# Patient Record
Sex: MALE | Race: WHITE
[De-identification: names, ages, dates, MRNs, and addresses within clinical notes are randomized per-mention and may not be internally consistent; named-entity substitution may affect disease eponyms.]

---

## 2017-09-10 ENCOUNTER — HOSPITAL ENCOUNTER (EMERGENCY)
Dept: HOSPITAL 45 - C.EDB | Age: 22
Discharge: HOME | End: 2017-09-10
Payer: COMMERCIAL

## 2017-09-10 VITALS — OXYGEN SATURATION: 92 %

## 2017-09-10 VITALS
HEIGHT: 67.99 IN | HEIGHT: 67.99 IN | BODY MASS INDEX: 23.72 KG/M2 | WEIGHT: 156.53 LBS | WEIGHT: 156.53 LBS | BODY MASS INDEX: 23.72 KG/M2

## 2017-09-10 VITALS — OXYGEN SATURATION: 99 % | SYSTOLIC BLOOD PRESSURE: 97 MMHG | DIASTOLIC BLOOD PRESSURE: 49 MMHG | HEART RATE: 90 BPM

## 2017-09-10 VITALS — TEMPERATURE: 98.24 F
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
## 2017-09-10 DIAGNOSIS — F10.129: ICD-10-CM

## 2017-09-10 DIAGNOSIS — F17.200: Primary | ICD-10-CM

## 2017-09-10 LAB
ANION GAP SERPL CALC-SCNC: 8 MMOL/L (ref 3–11)
BUN SERPL-MCNC: 22 MG/DL (ref 7–18)
BUN/CREAT SERPL: 19.6 (ref 10–20)
CALCIUM SERPL-MCNC: 8.6 MG/DL (ref 8.5–10.1)
CHLORIDE SERPL-SCNC: 109 MMOL/L (ref 98–107)
CO2 SERPL-SCNC: 26 MMOL/L (ref 21–32)
CREAT CL PREDICTED SERPL C-G-VRATE: 103.6 ML/MIN
CREAT SERPL-MCNC: 1.1 MG/DL (ref 0.6–1.4)
GLUCOSE SERPL-MCNC: 90 MG/DL (ref 70–99)
POTASSIUM SERPL-SCNC: 3.6 MMOL/L (ref 3.5–5.1)
SODIUM SERPL-SCNC: 143 MMOL/L (ref 136–145)

## 2017-09-10 NOTE — EMERGENCY ROOM VISIT NOTE
ED Visit Note


First contact with patient:  08:15


Patient was signed out to me by Ron ALVARENGA.  Please see her dictation for full 

history and physical.  Patient remained stable while in the department this 

morning.  He did awake this morning without complaints.  He was reexamined.  

Patient had no additional complaints.  He was discharged to the care of a 

friend.  Patient will follow-up with [facility] for [facility] care.  

Alcohol intoxication instructions were given.  Avoid alcohol today.  Tylenol as 

needed for any minor headache or discomfort.


Current/Historical Medications


Unable to Obtain Active Prescriptions or Reported Meds





Vital Signs











  Date Time  Temp Pulse Resp B/P (MAP) Pulse Ox O2 Delivery O2 Flow Rate FiO2


 


9/10/17 09:41  90 15 97/49 99   


 


9/10/17 08:30  74 15 109/52 95   


 


9/10/17 08:00  84 14 88/46 98   


 


9/10/17 07:30  89 12 111/59 97   


 


9/10/17 07:00  76 12 117/66 98   


 


9/10/17 06:30    128/84 97   


 


9/10/17 06:15  70 16     


 


9/10/17 06:00    113/56 98   


 


9/10/17 05:45  82 15     


 


9/10/17 05:40  80 15     


 


9/10/17 05:30    114/55    


 


9/10/17 05:10  80 16     


 


9/10/17 05:08  82      


 


9/10/17 05:00    118/55    


 


9/10/17 04:40  79 14  94   


 


9/10/17 04:30    98/40    


 


9/10/17 04:10  83 14  95   


 


9/10/17 04:00    93/43    


 


9/10/17 03:40  80 15  95   


 


9/10/17 03:35  81 16  96   


 


9/10/17 03:05  98 20  96   


 


9/10/17 03:00    98/56    


 


9/10/17 02:35  92   98   


 


9/10/17 02:30    104/59    


 


9/10/17 02:28  90 16  98 Nasal Cannula 2.0 


 


9/10/17 02:00    99/57    


 


9/10/17 01:58  98 13  97 Nasal Cannula 2.0 


 


9/10/17 01:30    92/59    


 


9/10/17 01:28  86 0  90   


 


9/10/17 01:28     92 Nasal Cannula 2.0 


 


9/10/17 01:15     96 Room Air  


 


9/10/17 01:15     96 Room Air  


 


9/10/17 01:12  79      


 


9/10/17 01:09 36.8 95 16 121/68 98 Room Air  


 


9/10/17 01:06    121/68    











Laboratory Results


9/10/17 01:06

















Test


  9/10/17


01:06


 


Anion Gap


  8.0 mmol/L


(3-11)


 


Est Creatinine Clear Calc


Drug Dose 103.6 ml/min 


 


 


Estimated GFR (


American) 111.4 


 


 


Estimated GFR (Non-


American 96.1 


 


 


BUN/Creatinine Ratio 19.6 (10-20) 


 


Calcium Level


  8.6 mg/dl


(8.5-10.1)


 


Ethyl Alcohol mg/dL


  304.0 mg/dl


(0-3)











Departure Information


Impression





 Primary Impression:  


 Alcohol abuse


 Additional Impression:  


 Alcoholic intoxication





Dispostion


Home / Self-Care





Condition


GOOD





Prescriptions





Unable to Obtain Active Prescriptions or Reported Meds





Referrals


No Doctor, Assigned (PCP)








[facility]





Forms


ALCOHOL OVERDOSE (Under 21 yr), HOME CARE DOCUMENTATION FORM,                   

                                             TYLENOL USE, IMPORTANT VISIT 

INFORMATION





Patient Instructions


Alcohol Intoxication - [facility], [facility], [facility]: [employer] 

and Alcohol Related Visits





Additional Instructions





Stay well hydrated


avoid tylenol


recheck with your family doctor in 2-3 days


Return with worsening symptoms





Problem Qualifiers
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
## 2018-08-26 ENCOUNTER — HOSPITAL ENCOUNTER (EMERGENCY)
Dept: HOSPITAL 45 - C.EDB | Age: 23
Discharge: HOME | End: 2018-08-26
Payer: COMMERCIAL

## 2018-08-26 VITALS — TEMPERATURE: 97.7 F

## 2018-08-26 VITALS — SYSTOLIC BLOOD PRESSURE: 138 MMHG | HEART RATE: 105 BPM | OXYGEN SATURATION: 97 % | DIASTOLIC BLOOD PRESSURE: 75 MMHG

## 2018-08-26 VITALS
BODY MASS INDEX: 26.26 KG/M2 | WEIGHT: 173.28 LBS | WEIGHT: 173.28 LBS | HEIGHT: 67.99 IN | HEIGHT: 67.99 IN | BODY MASS INDEX: 26.26 KG/M2

## 2018-08-26 DIAGNOSIS — F17.290: ICD-10-CM

## 2018-08-26 DIAGNOSIS — F41.9: Primary | ICD-10-CM

## 2018-08-26 DIAGNOSIS — R31.9: ICD-10-CM

## 2018-08-26 LAB
ALBUMIN SERPL-MCNC: 4.2 GM/DL (ref 3.4–5)
ALP SERPL-CCNC: 114 U/L (ref 45–117)
ALT SERPL-CCNC: 58 U/L (ref 12–78)
AST SERPL-CCNC: 111 U/L (ref 15–37)
BASOPHILS # BLD: 0.06 K/UL (ref 0–0.2)
BASOPHILS NFR BLD: 0.6 %
BUN SERPL-MCNC: 10 MG/DL (ref 7–18)
CALCIUM SERPL-MCNC: 9.2 MG/DL (ref 8.5–10.1)
CO2 SERPL-SCNC: 28 MMOL/L (ref 21–32)
CREAT SERPL-MCNC: 1.12 MG/DL (ref 0.6–1.4)
EOS ABS #: 0.18 K/UL (ref 0–0.5)
EOSINOPHIL NFR BLD AUTO: 300 K/UL (ref 130–400)
GLUCOSE SERPL-MCNC: 98 MG/DL (ref 70–99)
HCT VFR BLD CALC: 43.4 % (ref 42–52)
HGB BLD-MCNC: 14.4 G/DL (ref 14–18)
IG#: 0.03 K/UL (ref 0–0.02)
IMM GRANULOCYTES NFR BLD AUTO: 21.8 %
LYMPHOCYTES # BLD: 2.32 K/UL (ref 1.2–3.4)
MCH RBC QN AUTO: 29.7 PG (ref 25–34)
MCHC RBC AUTO-ENTMCNC: 33.2 G/DL (ref 32–36)
MCV RBC AUTO: 89.5 FL (ref 80–100)
MONO ABS #: 0.72 K/UL (ref 0.11–0.59)
MONOCYTES NFR BLD: 6.8 %
NEUT ABS #: 7.35 K/UL (ref 1.4–6.5)
NEUTROPHILS # BLD AUTO: 1.7 %
NEUTROPHILS NFR BLD AUTO: 68.8 %
PMV BLD AUTO: 9.3 FL (ref 7.4–10.4)
POTASSIUM SERPL-SCNC: 3.8 MMOL/L (ref 3.5–5.1)
PROT SERPL-MCNC: 8.1 GM/DL (ref 6.4–8.2)
RED CELL DISTRIBUTION WIDTH CV: 12.6 % (ref 11.5–14.5)
RED CELL DISTRIBUTION WIDTH SD: 41 FL (ref 36.4–46.3)
SODIUM SERPL-SCNC: 141 MMOL/L (ref 136–145)
WBC # BLD AUTO: 10.66 K/UL (ref 4.8–10.8)

## 2018-08-26 NOTE — EMERGENCY ROOM VISIT NOTE
History


Report prepared by Shirley:  Ann Marie Garcia


Under the Supervision of:  Dr. Ryan Mercedes D.O.


First contact with patient:  08:28


Chief Complaint:  MENTAL HEALTH EVALUATION


Stated Complaint:  REACTION TO NEW ANXIETY MED?





History of Present Illness


The patient is a 23 year old male who presents to the Emergency Room with 

complaints of feeling constantly unwell beginning a few days ago. He states he 

is achy, having trouble sleeping, shaking, and experiencing chest pain. The 

patient notes he feels as though he is "in a nightmare" and that his head is 

"not right at all." He states he began a new anxiety medication that he is 

unsure the name of last week, prescribed by his doctor in [place]. The patient 

notes a history of anxiety and panic attacks, but states his current symptoms 

do not feel similar to those. Nursing staff reports the patient admits to 

buying street Xanax and taking it with his prescribed anxiety medication. They 

note the patient states he was drinking last night, along with his regular 

medication and the Xanax. The patient states he smokes Juul e-cigarettes.





   Source of History:  patient, nursing staff


   Onset:  a few days ago


   Position:  head


   Quality:  other (feeling unwell)


   Timing:  constant


   Associated Symptoms:  + chest pain


Note:


Associated symptom: achiness, difficulty sleeping, shaking





Review of Systems


See HPI for pertinent positives & negatives. A total of 10 systems reviewed and 

were otherwise negative.





Past Medical & Surgical


Medical Problems:


(1) Anxiety








Family History


No pertinent family history stated.





Social History


Smoking Status:  Current Every Day Smoker


Alcohol Use:  occasionally


Occupation Status:  student





Current/Historical Medications


Scheduled


Amphetamine-Dextroamphetamine 20MG (Adderall 20MG), 1 TAB PO Q Other Day





Miscellaneous Medications


Mirtazapine Soltab (Remeron Soltab), 15 MG PO





Physical Exam


Vital Signs











  Date Time  Temp Pulse Resp B/P (MAP) Pulse Ox O2 Delivery O2 Flow Rate FiO2


 


8/26/18 09:30  116 18 148/80 97 Room Air  


 


8/26/18 08:26  94      


 


8/26/18 08:17 36.5 96 18 169/98 98 Room Air  











Physical Exam


VITAL SIGNS: were reviewed as above.


GENERAL:Non-toxic in appearance.


SKIN: Warm dry and pink.


HEAD: Normocephalic and atraumatic.


OROPHARYNX: Is clear and moist


NECK: Supple without lymphadenopathy or meningismus.


LUNGS: clear.


HEART: Regular rate and rhythm.


ABDOMEN: Soft and nontender.


EXTREMITIES: Warm and well perfused.


NEUROLOGICALLY: Awake alert and oriented without focal deficit. Cranial nerves 2

-12 are intact. There is no pronator drift. Cerebellar testing is within normal 

limits. There is no nystagmus. There is no facial droop. Speech is clear. 

Vision is grossly normal.


MUSCULOSKELETAL: Good muscle tone. No evidence of trauma.





Medical Decision & Procedures


ER Provider


Diagnostic Interpretation:


Radiology results as stated below per my review and radiologist interpretation:





CHEST ONE VIEW PORTABLE





HISTORY:  23 years-old Male EVALUATE ALTERED MENTAL STATUS/WEAKNESS acute


weakness with altered mental status





COMPARISON: None available





TECHNIQUE: Portable AP view of the chest





FINDINGS: 


Cardiomediastinal and hilar silhouettes are within normal limits. No


pneumothorax, pleural effusion, focal airspace consolidation or overt pulmonary


edema. Bones of the chest appear grossly intact.





IMPRESSION: No acute process. 





The above report was generated using voice recognition software. It may contain


grammatical, syntax or spelling errors.





Electronically signed by:  Lisandro Morales M.D.


8/26/2018 8:58 AM





Dictated Date/Time:  8/26/2018 8:57 AM





Laboratory Results


8/26/18 08:45








Red Blood Count 4.85, Mean Corpuscular Volume 89.5, Mean Corpuscular Hemoglobin 

29.7, Mean Corpuscular Hemoglobin Concent 33.2, Mean Platelet Volume 9.3, 

Neutrophils (%) (Auto) 68.8, Lymphocytes (%) (Auto) 21.8, Monocytes (%) (Auto) 

6.8, Eosinophils (%) (Auto) 1.7, Basophils (%) (Auto) 0.6, Neutrophils # (Auto) 

7.35, Lymphocytes # (Auto) 2.32, Monocytes # (Auto) 0.72, Eosinophils # (Auto) 

0.18, Basophils # (Auto) 0.06





8/26/18 08:45

















Test


  8/26/18


08:45 8/26/18


08:50


 


White Blood Count


  10.66 K/uL


(4.8-10.8) 


 


 


Red Blood Count


  4.85 M/uL


(4.7-6.1) 


 


 


Hemoglobin


  14.4 g/dL


(14.0-18.0) 


 


 


Hematocrit 43.4 % (42-52)  


 


Mean Corpuscular Volume


  89.5 fL


() 


 


 


Mean Corpuscular Hemoglobin


  29.7 pg


(25-34) 


 


 


Mean Corpuscular Hemoglobin


Concent 33.2 g/dl


(32-36) 


 


 


Platelet Count


  300 K/uL


(130-400) 


 


 


Mean Platelet Volume


  9.3 fL


(7.4-10.4) 


 


 


Neutrophils (%) (Auto) 68.8 %  


 


Lymphocytes (%) (Auto) 21.8 %  


 


Monocytes (%) (Auto) 6.8 %  


 


Eosinophils (%) (Auto) 1.7 %  


 


Basophils (%) (Auto) 0.6 %  


 


Neutrophils # (Auto)


  7.35 K/uL


(1.4-6.5) 


 


 


Lymphocytes # (Auto)


  2.32 K/uL


(1.2-3.4) 


 


 


Monocytes # (Auto)


  0.72 K/uL


(0.11-0.59) 


 


 


Eosinophils # (Auto)


  0.18 K/uL


(0-0.5) 


 


 


Basophils # (Auto)


  0.06 K/uL


(0-0.2) 


 


 


RDW Standard Deviation


  41.0 fL


(36.4-46.3) 


 


 


RDW Coefficient of Variation


  12.6 %


(11.5-14.5) 


 


 


Immature Granulocyte % (Auto) 0.3 %  


 


Immature Granulocyte # (Auto)


  0.03 K/uL


(0.00-0.02) 


 


 


Anion Gap


  9.0 mmol/L


(3-11) 


 


 


Est Creatinine Clear Calc


Drug Dose 100.9 ml/min 


  


 


 


Estimated GFR (


American) 108.3 


  


 


 


Estimated GFR (Non-


American 93.4 


  


 


 


BUN/Creatinine Ratio 9.3 (10-20)  


 


Calcium Level


  9.2 mg/dl


(8.5-10.1) 


 


 


Total Bilirubin


  0.2 mg/dl


(0.2-1) 


 


 


Direct Bilirubin


  < 0.1 mg/dl


(0-0.2) 


 


 


Aspartate Amino Transf


(AST/SGOT) 111 U/L


(15-37) 


 


 


Alanine Aminotransferase


(ALT/SGPT) 58 U/L (12-78) 


  


 


 


Alkaline Phosphatase


  114 U/L


() 


 


 


Total Protein


  8.1 gm/dl


(6.4-8.2) 


 


 


Albumin


  4.2 gm/dl


(3.4-5.0) 


 


 


Thyroid Stimulating Hormone


(TSH) 3.870 uIu/ml


(0.300-4.500) 


 


 


Urine Color  YELLOW 


 


Urine Appearance  CLOUDY (CLEAR) 


 


Urine pH  6.5 (4.5-7.5) 


 


Urine Specific Gravity


  


  1.024


(1.000-1.030)


 


Urine Protein  NEG (NEG) 


 


Urine Glucose (UA)  NEG (NEG) 


 


Urine Ketones  NEG (NEG) 


 


Urine Occult Blood  1+ (NEG) 


 


Urine Nitrite  NEG (NEG) 


 


Urine Bilirubin  NEG (NEG) 


 


Urine Urobilinogen  NEG (NEG) 


 


Urine Leukocyte Esterase  NEG (NEG) 


 


Urine WBC (Auto)  1-5 /hpf (0-5) 


 


Urine RBC (Auto)


  


  5-10 /hpf


(0-4)


 


Urine Hyaline Casts (Auto)  1-5 /lpf (0-5) 


 


Urine Epithelial Cells (Auto)


  


  5-10 /lpf


(0-5)


 


Urine Bacteria (Auto)  NEG (NEG) 





Laboratory results as stated above per my review.





ECG Per My Interpretation


Indication:  chest pain


Rate (beats per minute):  90


Rhythm:  normal sinus


Findings:  no ectopy, other (no ST elevation)





ED Course


0831: Previous medical records were reviewed. The patient was evaluated in room 

B12B. A complete history and physical examination was performed.





Medical Decision


Differential includes acute coronary syndrome, myocardial infarction, CVA, TIA, 

anemia, infection, pneumonia, UTI, pyelonephritis, poor nutrition, dehydration, 

electrolyte disturbance,hypoglycemia.





This is a 23-year-old male who presents to the ED with a chief complaint of 

feeling out of it.  He states that yesterday he felt like he was in a 

nightmare.  He could not sleep.  He also reported little chest pain and 

achiness and shakiness.  He states that he has history of panic attacks but did 

not feel that his symptoms are related to anxiety.  He was started on a new 

anxiety medication a couple weeks ago.  The patient overall just feels weird.  

His vital signs reveal some hypertension initially.  He seems a little anxious.

  His physical exam and neurologic exam were normal.  An EKG shows normal sinus 

rhythm.  CBC and complete metabolic panel were normal and a TSH was normal.  

Urine did not show infection.  That was 1+ blood.  Chest x-ray was negative for 

acute disease.  The patient also had an EKG that shows a sinus rhythm without 

ischemic changes.  The patient was told the results.  He was felt to be stable 

for discharge.  He was advised to have his urine rechecked in the near future 

[employer] as he is a student there.





Impression





 Primary Impression:  


 Anxiety





Scribe Attestation


The scribe's documentation has been prepared under my direction and personally 

reviewed by me in its entirety. I confirm that the note above accurately 

reflects all work, treatment, procedures, and medical decision making performed 

by me.





Departure Information


Dispostion


Home / Self-Care





Referrals


No Doctor, Assigned (PCP)





Patient Instructions


My [facility]





Additional Instructions





Follow-up with your doctor for further care and evaluation in 1-2 days.  Return 

to the emergency department for worsening or new symptoms or any concerns.


You have been examined and treated today on an emergency basis only. This is 

not a substitute for, or an effort to provide, complete comprehensive medical 

care. It is impossible to recognize and treat all injuries or illnesses in a 

single emergency department visit. It is therefore important that you follow up 

closely with your doctor.  Call as soon as possible for an appointment.





Follow-up with [facility] this week for recheck of your 

urinalysis.  The urinalysis today showed 1+ blood.  Have this rechecked by 

[facility] or your doctor.

## 2018-08-26 NOTE — DIAGNOSTIC IMAGING REPORT
CHEST ONE VIEW PORTABLE



HISTORY:  23 years-old Male EVALUATE ALTERED MENTAL STATUS/WEAKNESS acute

weakness with altered mental status



COMPARISON: None available



TECHNIQUE: Portable AP view of the chest



FINDINGS: 

Cardiomediastinal and hilar silhouettes are within normal limits. No

pneumothorax, pleural effusion, focal airspace consolidation or overt pulmonary

edema. Bones of the chest appear grossly intact.



IMPRESSION: No acute process. 







The above report was generated using voice recognition software. It may contain

grammatical, syntax or spelling errors.







Electronically signed by:  Lisandro Morales M.D.

8/26/2018 8:58 AM



Dictated Date/Time:  8/26/2018 8:57 AM

## 2021-02-11 ENCOUNTER — APPOINTMENT (OUTPATIENT)
Dept: PLASTIC SURGERY | Facility: CLINIC | Age: 26
End: 2021-02-11
Payer: COMMERCIAL

## 2021-02-11 VITALS
OXYGEN SATURATION: 99 % | RESPIRATION RATE: 20 BRPM | HEART RATE: 82 BPM | DIASTOLIC BLOOD PRESSURE: 82 MMHG | TEMPERATURE: 97.5 F | SYSTOLIC BLOOD PRESSURE: 121 MMHG

## 2021-02-11 DIAGNOSIS — L90.5 SCAR CONDITIONS AND FIBROSIS OF SKIN: ICD-10-CM

## 2021-02-11 PROBLEM — Z00.00 ENCOUNTER FOR PREVENTIVE HEALTH EXAMINATION: Status: ACTIVE | Noted: 2021-02-11

## 2021-02-11 PROCEDURE — 99072 ADDL SUPL MATRL&STAF TM PHE: CPT

## 2021-02-11 PROCEDURE — 99203 OFFICE O/P NEW LOW 30 MIN: CPT

## 2021-02-11 RX ORDER — FINASTERIDE 5 MG/1
5 TABLET, FILM COATED ORAL
Refills: 0 | Status: ACTIVE | COMMUNITY

## 2021-02-11 RX ORDER — DEXTROAMPHETAMINE SACCHARATE, AMPHETAMINE ASPARTATE, DEXTROAMPHETAMINE SULFATE, AND AMPHETAMINE SULFATE 2.5; 2.5; 2.5; 2.5 MG/1; MG/1; MG/1; MG/1
10 TABLET ORAL
Refills: 0 | Status: ACTIVE | COMMUNITY

## 2021-03-24 PROBLEM — L90.5 SCAR CONDITIONS AND FIBROSIS OF SKIN: Status: ACTIVE | Noted: 2021-03-24

## 2021-03-24 NOTE — PHYSICAL EXAM
[NI] : Normal [de-identified] : laceration abrasion with sutures in place no infection  clean and healing

## 2021-03-24 NOTE — ASSESSMENT
[FreeTextEntry1] : All of JULIANNE's incisions are clean dry and healing well.  His  sutures were removed and areas steri stripped. The instructions were reviewed in detail with JULIANNE. JULIANNE  has had uncomplicated recovery from his injury  and repair JULIANNE will return to the office for a post procedure visit prn  encouraged silicone covering and sunblock
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
